# Patient Record
Sex: FEMALE | Race: BLACK OR AFRICAN AMERICAN | NOT HISPANIC OR LATINO | URBAN - METROPOLITAN AREA
[De-identification: names, ages, dates, MRNs, and addresses within clinical notes are randomized per-mention and may not be internally consistent; named-entity substitution may affect disease eponyms.]

---

## 2024-04-16 ENCOUNTER — EMERGENCY (EMERGENCY)
Facility: HOSPITAL | Age: 59
LOS: 1 days | Discharge: HOME | End: 2024-04-16
Attending: STUDENT IN AN ORGANIZED HEALTH CARE EDUCATION/TRAINING PROGRAM | Admitting: STUDENT IN AN ORGANIZED HEALTH CARE EDUCATION/TRAINING PROGRAM
Payer: MEDICARE

## 2024-04-16 VITALS
RESPIRATION RATE: 16 BRPM | HEIGHT: 59 IN | SYSTOLIC BLOOD PRESSURE: 148 MMHG | TEMPERATURE: 98 F | HEART RATE: 76 BPM | OXYGEN SATURATION: 96 % | WEIGHT: 147.05 LBS | DIASTOLIC BLOOD PRESSURE: 73 MMHG

## 2024-04-16 RX ORDER — ACETAMINOPHEN 500 MG
650 TABLET ORAL ONCE
Refills: 0 | Status: COMPLETED | OUTPATIENT
Start: 2024-04-16 | End: 2024-04-16

## 2024-04-16 RX ADMIN — Medication 650 MILLIGRAM(S): at 11:18

## 2024-04-16 NOTE — ED PROVIDER NOTE - PATIENT PORTAL LINK FT
You can access the FollowMyHealth Patient Portal offered by [facility] by registering at the following website: [email or web address]. By joining [employer]’s FollowMyHealth portal, you will also be able to view your health information using other applications (apps) compatible with our system.

## 2024-04-16 NOTE — ED PROVIDER NOTE - OBJECTIVE STATEMENT
58F PMHx of HTN, HLD, presenting with left calf pain x 1 day, ongoing for years. Since having a mechanical fall about 5-6 years ago, landing on her left calf, without known fracture, she has been having recurrent pain to the left calf area mild, positional, nonradiating occurring intermittently. Denies any chest pain, abdominal pain, shortness of breath, nausea/vomiting, headaches, fevers, chills,  weakness, syncope,   urinary symptoms, subjective neurological deficits, recent traumatic injuries, recent falls.

## 2024-04-16 NOTE — ED ADULT NURSE NOTE - OBJECTIVE STATEMENT
58 yr old female to ED for complaints of left lower leg pain. Patient states she started having pain yesterday. Denies any other complaints. + stong pulses, + sensation, skin warm/dry.

## 2024-04-16 NOTE — ED ADULT NURSE NOTE - NSFALLUNIVINTERV_ED_ALL_ED
Bed/Stretcher in lowest position, wheels locked, appropriate side rails in place/Call bell, personal items and telephone in reach/Instruct patient to call for assistance before getting out of bed/chair/stretcher/Non-slip footwear applied when patient is off stretcher/[place] to call system/Physically safe environment - no spills, clutter or unnecessary equipment/Purposeful proactive rounding/Room/bathroom lighting operational, light cord in reach

## 2024-04-16 NOTE — ED PROVIDER NOTE - CLINICAL SUMMARY MEDICAL DECISION MAKING FREE TEXT BOX
Dr. Collins Thacker MD, EM And Medical Toxicology AttendinF PMHx of HTN, HLD, presenting with left calf pain x 1 day, ongoing for years. Since having a mechanical fall about 5-6 years ago, landing on her left calf, without known fracture, she has been having recurrent pain to the left calf area mild, positional, nonradiating occurring intermittently. Denies any chest pain, abdominal pain, shortness of breath, nausea/vomiting, headaches, fevers, chills,  weakness, syncope,   urinary symptoms, subjective neurological deficits, recent traumatic injuries, recent falls.  History obtained from independent historian: N/A  External note reviewed: N/A  DDx: msk, dvt,   Decision making, ED course, independent interpretation of imaging studies, and consults:   - DVT negative, XR negative  Consideration hospitalization vs de-escalation of care: DC  Disposition: DC

## 2024-04-16 NOTE — ED PROVIDER NOTE - PHYSICAL EXAMINATION
VITAL SIGNS: I have reviewed nursing notes and confirm.   GEN: Well-developed; well-nourished; in no acute distress. Speaking full sentences.  SKIN: Warm, pink, no rash, no diaphoresis, no cyanosis, well perfused.   HEAD: Normocephalic; atraumatic. No scalp lacerations, no abrasions.  NECK: Supple; non tender.   EYES: Pupils 3mm equal, round, reactive to light and accomodation, conjunctiva and sclera clear. Extra-ocular movements intact bilaterally.  ENT: No nasal discharge; airway clear. Trachea is midline. Normal dentition.  CV: RRR. S1, S2 normal; no murmurs, gallops, or rubs. Capillary refill < 2 seconds throughout. Distal pulses intact 2+ throughout.  RESP: CTA bilaterally. No wheezes, rales, or rhonchi.   ABD: Normal bowel sounds, soft, non-distended, non-tender, no rebound, no guarding, no rigidity, no hepatosplenomegaly.  MSK: Normal range of motion and movement of all 4 extremities. (+) LEFT lateral calf ttp mild  BACK: No thoracolumbar midline or paravertebral tenderness. No step-offs or obvious deformities.  NEURO: Alert & oriented x 3, Grossly unremarkable. Sensory and motor intact throughout. No focal deficits. Gait: Fluid. Normal speech and coordination.

## 2024-04-16 NOTE — ED PROVIDER NOTE - NSFOLLOWUPINSTRUCTIONS_ED_ALL_ED_FT
Rest, drink plenty of fluids.  Advance activity as tolerated.  Continue all previously prescribed medications as directed.  Follow up with your PMD 2-3 days and bring copies of your results.  Return to the ER for worsening symptoms.    Take acetaminophen 650 mg orally every 6-8 hours for pain control as needed. Please do not exceed 4,000 mg of acetaminophen during a 24 hours period. Acetaminophen can be found in many over-the-counter cold medications as well as opioid medications that may be given for pain.    Take ibuprofen (also known as MOTRIN or ADVIL) 400 mg orally every 6-8 hours for pain control as needed with food to avoid an upset stomach. Ibuprofen can be found in many over-the-counter medications. Please do not take ibuprofen if you have a bleeding disorder, stomach or gastrointestinal ulcer, or liver disease.    If needed, you can alternate these medications so that you can take one medication every 3 hours. For example, at noon take ibuprofen, then at 3PM take acetaminophen, then at 6PM take ibuprofen.

## 2025-04-17 ENCOUNTER — EMERGENCY (EMERGENCY)
Facility: HOSPITAL | Age: 60
LOS: 1 days | End: 2025-04-17
Attending: EMERGENCY MEDICINE | Admitting: EMERGENCY MEDICINE
Payer: MEDICARE

## 2025-04-17 VITALS
WEIGHT: 143.96 LBS | HEART RATE: 64 BPM | DIASTOLIC BLOOD PRESSURE: 79 MMHG | TEMPERATURE: 98 F | HEIGHT: 59 IN | RESPIRATION RATE: 18 BRPM | SYSTOLIC BLOOD PRESSURE: 163 MMHG | OXYGEN SATURATION: 100 %

## 2025-04-17 PROCEDURE — 99282 EMERGENCY DEPT VISIT SF MDM: CPT

## 2025-04-17 PROCEDURE — 99283 EMERGENCY DEPT VISIT LOW MDM: CPT

## 2025-04-17 RX ORDER — DIPHENHYDRAMINE HCL 12.5MG/5ML
1 ELIXIR ORAL
Qty: 15 | Refills: 0
Start: 2025-04-17 | End: 2025-04-21

## 2025-04-17 RX ORDER — DIPHENHYDRAMINE HCL 12.5MG/5ML
25 ELIXIR ORAL ONCE
Refills: 0 | Status: COMPLETED | OUTPATIENT
Start: 2025-04-17 | End: 2025-04-17

## 2025-04-17 RX ADMIN — Medication 25 MILLIGRAM(S): at 13:40

## 2025-04-17 NOTE — ED PROVIDER NOTE - PATIENT PORTAL LINK FT
You can access the FollowMyHealth Patient Portal offered by [facility] by registering at the following website: [email or web address]. By joining [employer]’s FollowMyHealth portal, you will also be able to view your health information using other applications (apps) compatible with our system.

## 2025-04-17 NOTE — ED ADULT NURSE NOTE - OBJECTIVE STATEMENT
pt came in from [facility] for bilateral hand itching after using a cleaning product of a lemon scented wipes while cleaning down the table.  Patient states she had itching to the hands followed by a dry skin.  No shortness of breath.  No rash.  Patient has known allergy to alcohol.  Does not know if these wipes were about alcohol-based.  Patient was not using gloves

## 2025-04-17 NOTE — ED PROVIDER NOTE - CARE PLAN
[FreeTextEntry1] : Constipation improved on Miralax\par \par Colonoscopy  DUe this year\par Mammo Yearly\par GYN Dec\par Optho Y\par Derm N\par BMD 10/18\par \par \par Constipation improved. Principal Discharge DX:	Itching of both hands   1

## 2025-04-17 NOTE — ED PROVIDER NOTE - PHYSICAL EXAMINATION
Gen:  Well appearing in NAD  Head:  NC/AT  Resp: No distress   Ext: no deformities  Skin: warm and dry as visualized , b/l hands without rash but noted dry skin. No breaks in skin. Palms. Dorsum of b/l hands normal.

## 2025-04-17 NOTE — ED ADULT TRIAGE NOTE - CHIEF COMPLAINT QUOTE
patient cleaning with cleaning solution and no gloves on when her right hand became very itchy and white. patient reports allergy to alcohol, typically cleans with gloves on  denies any other symptoms at this time

## 2025-04-17 NOTE — ED PROVIDER NOTE - NSFOLLOWUPINSTRUCTIONS_ED_ALL_ED_FT
Allergic Reaction    An allergic reaction is an abnormal reaction to a substance (allergen) by the body's defense system. Common allergens include medicines, food, insect bites or stings, and blood products. The body releases certain proteins into the blood that can cause a variety of symptoms such as an itchy rash, wheezing, swelling of the face/lips/tongue/throat, abdominal pain, nausea or vomiting. An allergic reaction is usually treated with medication. If your health care provider prescribed you an epinephrine injection device, make sure to keep it with you at all times.    Wear gloves when using any cleaning products. Take Benadryl 25mg every 8 hours as needed for itchiness. Keep moisturized with skin emmollient like vaseline.     SEEK IMMEDIATE MEDICAL CARE IF YOU HAVE ANY OF THE FOLLOWING SYMPTOMS: allergic reaction severe enough that required you to use epinephrine, tightness in your chest, swelling around your lips/tongue/throat, abdominal pain, vomiting or diarrhea, or lightheadedness/dizziness. These symptoms may represent a serious problem that is an emergency. Do not wait to see if the symptoms will go away. Use your auto-injector pen or anaphylaxis kit as you have been instructed. Call 911 and do not drive yourself to the hospital.

## 2025-04-17 NOTE — ED PROVIDER NOTE - CLINICAL SUMMARY MEDICAL DECISION MAKING FREE TEXT BOX
59-year-old female presents emergency department for bilateral hand itching after using a cleaning product of a lemon scented wipes while cleaning down the table.  Patient states she had itching to the hands followed by a dry skin.  No shortness of breath.  No rash.  Patient has known allergy to alcohol.  Does not know if these wipes were about alcohol-based.  Patient was not using gloves  Exam as stated. Recc benadryl. Keep hands clean and moisturized.     1) Follow up with your doctor in 1-2 days  2) Return to the ER for worsening or concerning symptoms

## 2025-05-10 ENCOUNTER — EMERGENCY (EMERGENCY)
Facility: HOSPITAL | Age: 60
LOS: 1 days | End: 2025-05-10
Attending: EMERGENCY MEDICINE | Admitting: EMERGENCY MEDICINE
Payer: MEDICARE

## 2025-05-10 VITALS
HEIGHT: 59 IN | HEART RATE: 75 BPM | RESPIRATION RATE: 15 BRPM | OXYGEN SATURATION: 99 % | TEMPERATURE: 98 F | WEIGHT: 149.03 LBS | DIASTOLIC BLOOD PRESSURE: 81 MMHG | SYSTOLIC BLOOD PRESSURE: 127 MMHG

## 2025-05-10 PROCEDURE — 99284 EMERGENCY DEPT VISIT MOD MDM: CPT

## 2025-05-10 PROCEDURE — 73660 X-RAY EXAM OF TOE(S): CPT

## 2025-05-10 PROCEDURE — 99283 EMERGENCY DEPT VISIT LOW MDM: CPT

## 2025-05-10 PROCEDURE — 73660 X-RAY EXAM OF TOE(S): CPT | Mod: 26,RT

## 2025-05-10 NOTE — ED ADULT TRIAGE NOTE - CHIEF COMPLAINT QUOTE
c/o right foot pain from possible foreign body since 5 days ago. States she broke a glass and might have stepped on a piece of glass on the floor. Denies any fever/chills.

## 2025-05-10 NOTE — ED PROVIDER NOTE - NSFOLLOWUPINSTRUCTIONS_ED_ALL_ED_FT
You had a foreign body sensation in your toe.  There was no evidence of a foreign body on x-ray.  There is also no sign of infection at this time.  If your symptoms continue you should follow-up with a podiatrist.

## 2025-05-10 NOTE — ED PROVIDER NOTE - PHYSICAL EXAMINATION
Vitals: I have reviewed the patients vital signs  General: nontoxic appearing  HEENT: Atraumatic, normocephalic, airway patent  Eyes: EOMI, tracking appropriately  Neck: no tracheal deviation  Chest/Lungs: no trauma, symmetric chest rise, speaking in complete sentences,  no resp distress  Heart: skin and extremities well perfused, regular rate and rhythm  Neuro: A+Ox3, appears non focal  MSK: no deformities  Skin: no cyanosis, no jaundice base of the right foot at the base of the second toe there is an area that is black with some callus-like formation.  There is no foreign body appreciated it is minimally tender to palpation there is no surrounding erythema  Psych:  Normal mood and affect

## 2025-05-10 NOTE — ED PROVIDER NOTE - OBJECTIVE STATEMENT
59-year-old female presents emerged from today with concern that she stepped on glass 5 days ago.  Said that she broke a glass on the floor and may have stepped on it.  She think she took all the glass out of the bottom of her foot however she does have some discomfort when she is walking.  She points to the area at the base of her second toe on the right.  There has been no drainage warmth or other complaints

## 2025-05-10 NOTE — ED PROVIDER NOTE - CLINICAL SUMMARY MEDICAL DECISION MAKING FREE TEXT BOX
Patient presenting with concern for foreign body in her left foot after stepping on glass 5 days ago.  Physical exam is not revealing of any type of infection nor significant tenderness.  I will get plain films to evaluate for the possibility of a foreign body.  X-ray shows no sign of foreign body.  I will discharge at this time he can follow-up with podiatry

## 2025-05-10 NOTE — ED PROVIDER NOTE - PATIENT PORTAL LINK FT
You can access the FollowMyHealth Patient Portal offered by [facility] by registering at the following website: [email or web address]. By joining [employer]’s FollowMyHealth portal, you will also be able to view your health information using other applications (apps) compatible with our system.

## 2025-05-10 NOTE — ED ADULT NURSE NOTE - OBJECTIVE STATEMENT
pt came in  from home with c/o right foot pain from possible foreign body since stepping on glass 5 days ago. States she broke a glass on her floor and might have stepped on a piece of glass on the floor. States she has pain in between her toes. Denies any fever/chills

## 2025-05-11 ENCOUNTER — EMERGENCY (EMERGENCY)
Facility: HOSPITAL | Age: 60
LOS: 1 days | End: 2025-05-11
Attending: STUDENT IN AN ORGANIZED HEALTH CARE EDUCATION/TRAINING PROGRAM | Admitting: STUDENT IN AN ORGANIZED HEALTH CARE EDUCATION/TRAINING PROGRAM
Payer: MEDICARE

## 2025-05-11 VITALS
DIASTOLIC BLOOD PRESSURE: 94 MMHG | HEIGHT: 59 IN | TEMPERATURE: 97 F | OXYGEN SATURATION: 100 % | SYSTOLIC BLOOD PRESSURE: 163 MMHG | WEIGHT: 149.03 LBS | HEART RATE: 61 BPM | RESPIRATION RATE: 18 BRPM

## 2025-05-11 VITALS
SYSTOLIC BLOOD PRESSURE: 155 MMHG | RESPIRATION RATE: 17 BRPM | DIASTOLIC BLOOD PRESSURE: 90 MMHG | OXYGEN SATURATION: 99 % | HEART RATE: 64 BPM | TEMPERATURE: 98 F

## 2025-05-11 PROBLEM — Z78.9 OTHER SPECIFIED HEALTH STATUS: Chronic | Status: ACTIVE | Noted: 2025-04-17

## 2025-05-11 PROCEDURE — 10060 I&D ABSCESS SIMPLE/SINGLE: CPT

## 2025-05-11 PROCEDURE — 73700 CT LOWER EXTREMITY W/O DYE: CPT

## 2025-05-11 PROCEDURE — 10120 INC&RMVL FB SUBQ TISS SMPL: CPT

## 2025-05-11 PROCEDURE — 99284 EMERGENCY DEPT VISIT MOD MDM: CPT | Mod: 25

## 2025-05-11 PROCEDURE — 73700 CT LOWER EXTREMITY W/O DYE: CPT | Mod: 26,RT

## 2025-05-11 RX ORDER — POVIDONE-IODINE 7.5 %
1 SOLUTION, NON-ORAL TOPICAL ONCE
Refills: 0 | Status: COMPLETED | OUTPATIENT
Start: 2025-05-11 | End: 2025-05-11

## 2025-05-11 RX ORDER — CEPHALEXIN 250 MG/1
1 CAPSULE ORAL
Qty: 28 | Refills: 0
Start: 2025-05-11 | End: 2025-05-17

## 2025-05-11 RX ORDER — DOXYCYCLINE HYCLATE 100 MG
1 TABLET ORAL
Qty: 14 | Refills: 0
Start: 2025-05-11 | End: 2025-05-17

## 2025-05-11 RX ORDER — CEPHALEXIN 250 MG/1
500 CAPSULE ORAL ONCE
Refills: 0 | Status: COMPLETED | OUTPATIENT
Start: 2025-05-11 | End: 2025-05-11

## 2025-05-11 RX ORDER — LIDOCAINE HCL/PF 10 MG/ML
10 VIAL (ML) INJECTION ONCE
Refills: 0 | Status: COMPLETED | OUTPATIENT
Start: 2025-05-11 | End: 2025-05-11

## 2025-05-11 RX ADMIN — Medication 10 MILLILITER(S): at 19:41

## 2025-05-11 RX ADMIN — Medication 1 APPLICATION(S): at 19:41

## 2025-05-11 RX ADMIN — CEPHALEXIN 500 MILLIGRAM(S): 250 CAPSULE ORAL at 17:59

## 2025-05-11 NOTE — ED PROVIDER NOTE - NSFOLLOWUPINSTRUCTIONS_ED_ALL_ED_FT
7 (severe pain) You were seen in the emergency department for foreign body in the right foot. You can find the results of all the tests in this discharge packet. Please follow up with your primary care doctor within 48 hours for continuation of care. Please follow up with your podiatrist within 1-2 days for further management.     Return to the emergency department if you experience any new/concerning/worsening symptoms such as but not limited to: fever (>100.3F), intractable nausea, vomiting, chest pain, shortness of breath, abdominal pain.     You are prescribed:  1) Doxycycline: 100mg every 12 hours for 7 days     If you do not have a physician or have difficulty following up, please call: [phone] to obtain a [facility] doctor or specialist who can provide follow up.

## 2025-05-11 NOTE — ED ADULT NURSE NOTE - NSFALLUNIVINTERV_ED_ALL_ED
Bed/Stretcher in lowest position, wheels locked, appropriate side rails in place/Call bell, personal items and telephone in reach/Instruct patient to call for assistance before getting out of bed/chair/stretcher/Non-slip footwear applied when patient is off stretcher/[place] to call system/Physically safe environment - no spills, clutter or unnecessary equipment/Purposeful proactive rounding/Room/bathroom lighting operational, light cord in reach

## 2025-05-11 NOTE — ED PROVIDER NOTE - PHYSICAL EXAMINATION
Vitals: I have reviewed the patients vital signs  General: Non-toxic appearing  HEENT: Atraumatic, normocephalic, airway patent  Eyes: EOMI, tracking appropriately  Neck: no tracheal deviation  Chest/Lungs: symmetric chest rise, speaking in complete sentences, no WOB  Heart: skin and extremities well perfused, regular rate and rhythm  Abdomen: soft, nontender and nondistended   Neuro: A+Ox3, ambulating without difficulty, CN grossly intact  MSK: strength at baseline in all extremities, no muscle wasting or atrophy  Skin: an area of black callus-like formation at the base of second toe on the right with mild tenderness noted with palpation but otherwise no surrounding erythema/drainage

## 2025-05-11 NOTE — ED PROCEDURE NOTE - CPROC ED TOLERANCE1
Pt on RA. Pt with no apparent respiratory distress noted. Will continue to monitor.     Patient tolerated procedure well.

## 2025-05-11 NOTE — ED PROVIDER NOTE - PROGRESS NOTE DETAILS
Nate Attending Physician   Reviewed CT foot - noted foreign body in the base of R 2nd toe. Attempted to retrieve the foreign body but unsuccessful; I converted the procedure to an I&D and evacuated a small amount of bloody purulent fluid and explored the cavity but still unable to locate the foreign body. Patient's right foot was soaked in a betadine solution for about 30 minutes and still unable to locate the foreign body upon reexploration. The cavity was then packed with xeroform gauze and dressed with curlex. Reviewed wound care instructions with patient and recommended 1-2 day follow up with her podiatrist and she voiced understanding. Rx for clindamycin provided to the patient. Nate Attending Physician   Reviewed CT foot - noted foreign body in the base of proximal phalanx of R 2nd toe. Attempted to retrieve the foreign body but unsuccessful; I converted the procedure to an I&D and evacuated a small amount of bloody purulent fluid and explored the cavity but still unable to locate the foreign body. Patient's right foot was soaked in a betadine solution for about 30 minutes and still unable to locate the foreign body upon reexploration. The cavity was then packed with xeroform gauze and wrapped with curlex/ace-wrap. Reviewed wound care instructions with patient and recommended 1-2 day follow up with her podiatrist and she voiced understanding. Rx for clindamycin provided to the patient.

## 2025-05-11 NOTE — ED PROVIDER NOTE - PATIENT PORTAL LINK FT
You can access the FollowMyHealth Patient Portal offered by [facility] by registering at the following website: [email or web address]. By joining [employer]’s FollowMyHealth portal, you will also be able to view your health information using other applications (apps) compatible with our system.

## 2025-05-11 NOTE — ED POST DISCHARGE NOTE - RESULT SUMMARY
glass fb on foot xray concern for air lucencies radiology recommending CT, called and made pt aware states she will return for a CT tmrw Rufina Lopez PA-C

## 2025-05-11 NOTE — ED ADULT NURSE REASSESSMENT NOTE - NS ED NURSE REASSESS COMMENT FT1
Patient received from previous RN. Offering no complaints at this time. Safety precautions maintained. Comfort measure provided.

## 2025-05-11 NOTE — ED PROVIDER NOTE - OBJECTIVE STATEMENT
Patient is a 59 year-old-female with no reported PMH presents for evaluation of foreign body in right foot. Reports that 5 days ago, she may have stepped on broken glass. She was seen here last night and told that there was no foreign body in her foot. However she received a call this afternoon advising her to come back because the xray showed there is a piece of foreign body. Reports pain to the base of her second toe on the right.

## 2025-05-11 NOTE — ED PROVIDER NOTE - CLINICAL SUMMARY MEDICAL DECISION MAKING FREE TEXT BOX
Patient is a 59 year-old-female with no reported PMH presents for evaluation of foreign body in right foot. Xray of right foot done on 5/10/25 showed "6.9 mm glass foreign body adjacent to second proximal phalanx. Diffuse soft tissue swelling with mottled air lucencies in the forefoot. CT recommended to exclude deep soft tissue abscess.". Plan to check CT foot to evaluate for abscess. Offered foreign body removal, however patient declined and would like to follow up with her own podiatrist for removal. Patient is a 59 year-old-female with no reported PMH presents for evaluation of foreign body in right foot. Xray of right foot done on 5/10/25 showed "6.9 mm glass foreign body adjacent to second proximal phalanx. Diffuse soft tissue swelling with mottled air lucencies in the forefoot. CT recommended to exclude deep soft tissue abscess.". Plan to check CT foot to evaluate for abscess.

## 2025-05-17 NOTE — ED ADULT NURSE NOTE - BIRTH SEX
No care due was identified.  [facility] Embedded Care Due Messages. Reference number: [id number].   5/17/2025 6:52:28 AM CDT   Female

## 2025-06-09 ENCOUNTER — NON-APPOINTMENT (OUTPATIENT)
Age: 60
End: 2025-06-09

## 2025-08-28 ENCOUNTER — EMERGENCY (EMERGENCY)
Facility: HOSPITAL | Age: 60
LOS: 1 days | End: 2025-08-28
Attending: STUDENT IN AN ORGANIZED HEALTH CARE EDUCATION/TRAINING PROGRAM | Admitting: STUDENT IN AN ORGANIZED HEALTH CARE EDUCATION/TRAINING PROGRAM
Payer: MEDICARE

## 2025-08-28 VITALS
OXYGEN SATURATION: 98 % | SYSTOLIC BLOOD PRESSURE: 128 MMHG | DIASTOLIC BLOOD PRESSURE: 70 MMHG | TEMPERATURE: 99 F | WEIGHT: 143.08 LBS | HEIGHT: 59 IN | HEART RATE: 82 BPM | RESPIRATION RATE: 16 BRPM

## 2025-08-28 PROCEDURE — 99283 EMERGENCY DEPT VISIT LOW MDM: CPT

## 2025-08-28 PROCEDURE — 99284 EMERGENCY DEPT VISIT MOD MDM: CPT

## 2025-08-28 RX ORDER — AMOXICILLIN 500 MG/1
1 CAPSULE ORAL
Qty: 21 | Refills: 0
Start: 2025-08-28 | End: 2025-09-03

## 2025-08-31 ENCOUNTER — NON-APPOINTMENT (OUTPATIENT)
Age: 60
End: 2025-08-31

## 2025-09-07 ENCOUNTER — NON-APPOINTMENT (OUTPATIENT)
Age: 60
End: 2025-09-07